# Patient Record
(demographics unavailable — no encounter records)

---

## 2024-11-12 NOTE — ASSESSMENT
[FreeTextEntry1] : SHANTI: Vasc: DP & PT palpable 2/4 b/l. CFT <3 seconds to all 10 digits b/l. TG warm to warm b/l/ Derm: Right foot ecchymosis and edema noted to the dorsal aspect of the foot. No open wounds or lesions noted.  Neuro: Gross and light touch sensation intact b/l MSK: PTP to the dorsal aspect of the 1st TMTJ, dorsal medial and lateral foot.   A: Right foot pain   P: Patient evaluated and all findings discussed with the patient All questions answered to the patient satisifaction Xray reviewed- no acute fx noted  Patient has been NWB in posterior splint Transition to HWBAT RLE Applied [employer] compression  Rx CT RLE to r/o lisfranc rupture/ 3rd met base fx   RTC: 1 week  Written by Jeffry Thompson [email or web address]

## 2024-11-12 NOTE — HISTORY OF PRESENT ILLNESS
[FreeTextEntry1] : 58 female presents for right foot pain. Patient reports while walking downstairs tripping twisting her right ankle on 11/9. Patient reports she slid down the stairs on her buttocks and her right foot may have made impact against the wall at the bottom of the stairs. Reports pain at the right foot the arch of her foot. Denies any other trauma. Patient states since being in the ED she has been in a posterior splint and she has been NWB. Patient states her pain is a 6/10 today.

## 2024-11-19 NOTE — ASSESSMENT
[FreeTextEntry1] : SHANTI: Vasc: DP & PT palpable 2/4 b/l. CFT <3 seconds to all 10 digits b/l. TG warm to warm b/l/ Derm: Right foot ecchymosis and edema noted to the dorsal aspect of the foot however; much improved since last visit. No open wounds or lesions noted.  Neuro: Gross and light touch sensation intact b/l MSK: PTP to the dorsal aspect of the 1st TMTJ, dorsal medial and lateral foot.   A: Right foot pain  R. foot lisfranc injury   CT R. foot:  - FINDINGS: There is an acute comminuted and mildly displaced fracture at the base of the fourth metatarsal. There are also acute nondisplaced fractures of the dorsal distal aspect of the medial and lateral cuneiforms. There is also an an acute minimally displaced fracture at the lateral aspect of the base of the first metatarsal.  There are no advanced arthritic changes. There are plantar and dorsal calcaneal enthesophytes. There is a dorsal spur at the talar head. There is subcutaneous soft tissue edema about the ankle and along the dorsum of the foot.  IMPRESSION: Acute fractures of the bases of the first and fourth metatarsals and of the dorsal aspects of the distal medial and lateral cuneiforms. Consider MRI to evaluate for any associated Lisfranc ligamentous injury.  P: Patient evaluated and all findings discussed with the patient All questions answered to the patient satisfaction CT scan reviewed; results noted above  [employer] [place] compression; strict NWB RLE  D/w patient regarding surgical vs. conservative options for lisfranc injury with benefits and risks of both; patient opted for surgical management; office to reach out about surgical planning  Medical clearance needed; pt has h/o preDM and HLD Smoking cessation and possible complications d/w patient and patient's family until full understanding with  present  RTC: 1 week after surgery

## 2024-11-19 NOTE — HISTORY OF PRESENT ILLNESS
[FreeTextEntry1] : 58 female presents for right foot pain; last seen in clinic on 11/12 for an injury while walking downstair on 11/9. Patient reports she slid down the stairs on her buttocks and her right foot may have made impact against the wall at the bottom of the stairs. Reports pain at the right foot the arch of her foot however; improved since last visit. Denies any other trauma. Patient states since being in the ED she has been in a posterior splint and was told to be NWB however; since last clinic visit, patient has been walking on R. foot. Patient states her pain is a 4/10 today. Of note, patient states she smokes "a lot" for many years. Denies further pedal complaints.